# Patient Record
Sex: MALE | Race: OTHER | HISPANIC OR LATINO | URBAN - METROPOLITAN AREA
[De-identification: names, ages, dates, MRNs, and addresses within clinical notes are randomized per-mention and may not be internally consistent; named-entity substitution may affect disease eponyms.]

---

## 2018-12-25 ENCOUNTER — EMERGENCY (EMERGENCY)
Facility: HOSPITAL | Age: 23
LOS: 1 days | Discharge: HOME | End: 2018-12-25
Attending: EMERGENCY MEDICINE | Admitting: EMERGENCY MEDICINE
Payer: SELF-PAY

## 2018-12-25 VITALS
RESPIRATION RATE: 20 BRPM | TEMPERATURE: 98 F | OXYGEN SATURATION: 98 % | DIASTOLIC BLOOD PRESSURE: 82 MMHG | SYSTOLIC BLOOD PRESSURE: 141 MMHG | HEART RATE: 70 BPM

## 2018-12-25 DIAGNOSIS — R10.84 GENERALIZED ABDOMINAL PAIN: ICD-10-CM

## 2018-12-25 DIAGNOSIS — J45.909 UNSPECIFIED ASTHMA, UNCOMPLICATED: ICD-10-CM

## 2018-12-25 DIAGNOSIS — R06.02 SHORTNESS OF BREATH: ICD-10-CM

## 2018-12-25 DIAGNOSIS — R11.2 NAUSEA WITH VOMITING, UNSPECIFIED: ICD-10-CM

## 2018-12-25 DIAGNOSIS — R50.9 FEVER, UNSPECIFIED: ICD-10-CM

## 2018-12-25 LAB
ALBUMIN SERPL ELPH-MCNC: 3.5 G/DL — SIGNIFICANT CHANGE UP (ref 3.4–5)
ALP SERPL-CCNC: 74 U/L — SIGNIFICANT CHANGE UP (ref 40–120)
ALT FLD-CCNC: 20 U/L — SIGNIFICANT CHANGE UP (ref 12–42)
ANION GAP SERPL CALC-SCNC: 6 MMOL/L — LOW (ref 9–16)
AST SERPL-CCNC: 42 U/L — HIGH (ref 15–37)
BASOPHILS NFR BLD AUTO: 0.1 % — SIGNIFICANT CHANGE UP (ref 0–2)
BILIRUB SERPL-MCNC: 0.5 MG/DL — SIGNIFICANT CHANGE UP (ref 0.2–1.2)
BUN SERPL-MCNC: 8 MG/DL — SIGNIFICANT CHANGE UP (ref 7–23)
CALCIUM SERPL-MCNC: 8.9 MG/DL — SIGNIFICANT CHANGE UP (ref 8.5–10.5)
CHLORIDE SERPL-SCNC: 104 MMOL/L — SIGNIFICANT CHANGE UP (ref 96–108)
CO2 SERPL-SCNC: 29 MMOL/L — SIGNIFICANT CHANGE UP (ref 22–31)
CREAT SERPL-MCNC: 0.75 MG/DL — SIGNIFICANT CHANGE UP (ref 0.5–1.3)
EOSINOPHIL NFR BLD AUTO: 2 % — SIGNIFICANT CHANGE UP (ref 0–6)
GLUCOSE SERPL-MCNC: 131 MG/DL — HIGH (ref 70–99)
HCT VFR BLD CALC: 44.3 % — SIGNIFICANT CHANGE UP (ref 39–50)
HGB BLD-MCNC: 14.9 G/DL — SIGNIFICANT CHANGE UP (ref 13–17)
IMM GRANULOCYTES NFR BLD AUTO: 0.2 % — SIGNIFICANT CHANGE UP (ref 0–1.5)
LYMPHOCYTES # BLD AUTO: 16.2 % — SIGNIFICANT CHANGE UP (ref 13–44)
MCHC RBC-ENTMCNC: 29.2 PG — SIGNIFICANT CHANGE UP (ref 27–34)
MCHC RBC-ENTMCNC: 33.6 G/DL — SIGNIFICANT CHANGE UP (ref 32–36)
MCV RBC AUTO: 86.9 FL — SIGNIFICANT CHANGE UP (ref 80–100)
MONOCYTES NFR BLD AUTO: 8.5 % — SIGNIFICANT CHANGE UP (ref 2–14)
NEUTROPHILS NFR BLD AUTO: 73 % — SIGNIFICANT CHANGE UP (ref 43–77)
PLATELET # BLD AUTO: 226 K/UL — SIGNIFICANT CHANGE UP (ref 150–400)
POTASSIUM SERPL-MCNC: 4.2 MMOL/L — SIGNIFICANT CHANGE UP (ref 3.5–5.3)
POTASSIUM SERPL-SCNC: 4.2 MMOL/L — SIGNIFICANT CHANGE UP (ref 3.5–5.3)
PROT SERPL-MCNC: 6.7 G/DL — SIGNIFICANT CHANGE UP (ref 6.4–8.2)
RBC # BLD: 5.1 M/UL — SIGNIFICANT CHANGE UP (ref 4.2–5.8)
RBC # FLD: 13.7 % — SIGNIFICANT CHANGE UP (ref 10.3–14.5)
SODIUM SERPL-SCNC: 139 MMOL/L — SIGNIFICANT CHANGE UP (ref 132–145)
WBC # BLD: 8.4 K/UL — SIGNIFICANT CHANGE UP (ref 3.8–10.5)
WBC # FLD AUTO: 8.4 K/UL — SIGNIFICANT CHANGE UP (ref 3.8–10.5)

## 2018-12-25 PROCEDURE — 99284 EMERGENCY DEPT VISIT MOD MDM: CPT

## 2018-12-25 RX ORDER — SODIUM CHLORIDE 9 MG/ML
1000 INJECTION INTRAMUSCULAR; INTRAVENOUS; SUBCUTANEOUS ONCE
Qty: 0 | Refills: 0 | Status: COMPLETED | OUTPATIENT
Start: 2018-12-25 | End: 2018-12-25

## 2018-12-25 RX ORDER — SODIUM CHLORIDE 9 MG/ML
3 INJECTION INTRAMUSCULAR; INTRAVENOUS; SUBCUTANEOUS ONCE
Qty: 0 | Refills: 0 | Status: COMPLETED | OUTPATIENT
Start: 2018-12-25 | End: 2018-12-25

## 2018-12-25 RX ORDER — METOCLOPRAMIDE HCL 10 MG
10 TABLET ORAL ONCE
Qty: 0 | Refills: 0 | Status: COMPLETED | OUTPATIENT
Start: 2018-12-25 | End: 2018-12-25

## 2018-12-25 RX ORDER — FAMOTIDINE 10 MG/ML
20 INJECTION INTRAVENOUS ONCE
Qty: 0 | Refills: 0 | Status: COMPLETED | OUTPATIENT
Start: 2018-12-25 | End: 2018-12-25

## 2018-12-25 RX ADMIN — SODIUM CHLORIDE 3 MILLILITER(S): 9 INJECTION INTRAMUSCULAR; INTRAVENOUS; SUBCUTANEOUS at 21:19

## 2018-12-25 RX ADMIN — SODIUM CHLORIDE 1000 MILLILITER(S): 9 INJECTION INTRAMUSCULAR; INTRAVENOUS; SUBCUTANEOUS at 21:19

## 2018-12-25 RX ADMIN — FAMOTIDINE 20 MILLIGRAM(S): 10 INJECTION INTRAVENOUS at 21:19

## 2018-12-25 RX ADMIN — Medication 30 MILLILITER(S): at 21:19

## 2018-12-25 RX ADMIN — Medication 10 MILLIGRAM(S): at 21:19

## 2018-12-25 NOTE — ED PROVIDER NOTE - OBJECTIVE STATEMENT
24 y/o M w/ pmhx of asthma BIBEMS presents to ED w/ c/o lower abd pain w/ N/V/D since yesterday. Pt states that they feel "feverish". Pt also states that they have been having difficulty breathing and wheezing. Pt was on hormones and stopped 6 months ago due to being banned from the clinic they were receiving them from. Denies any chest pain, back pain, 22 y/o M w/ pmhx of asthma BIBEMS presents to ED w/ c/o lower abd pain w/ N/V/D since yesterday.  Pt was on hormones and stopped 6 months ago due to being banned from the clinic they were receiving them from. Denies any chest pain, back pain,

## 2018-12-25 NOTE — ED PROVIDER NOTE - NSFOLLOWUPINSTRUCTIONS_ED_ALL_ED_FT
PLEASE FOLLOW-UP WITH YOUR PRIMARY CARE DOCTOR IN 1-2 DAY FOR FURTHER EVALUATION.  PLEASE TAKE ALL PAPERWORK FROM TODAY'S VISIT TO YOUR PRIMARY DOCTOR.  IF YOU DO NOT HAVE A PRIMARY CARE DOCTOR PLEASE REFER TO THE OFFICE/CLINIC INFORMATION GIVEN ABOVE.  YOU MAY ALSO CALL [phone] AND ASK FOR MS. DARIELA ELLER.  SHE CAN HELP YOU MAKE A FOLLOW-UP APPOINTMENT.  HER HOURS ARE 11AM-7PM MONDAY - FRIDAY

## 2020-06-18 ENCOUNTER — APPOINTMENT (OUTPATIENT)
Dept: INFECTIOUS DISEASE | Facility: CLINIC | Age: 25
End: 2020-06-18

## 2020-06-18 PROBLEM — Z00.00 ENCOUNTER FOR PREVENTIVE HEALTH EXAMINATION: Status: ACTIVE | Noted: 2020-06-18

## 2021-01-24 ENCOUNTER — EMERGENCY (EMERGENCY)
Facility: HOSPITAL | Age: 26
LOS: 1 days | Discharge: HOME | End: 2021-01-24
Attending: EMERGENCY MEDICINE | Admitting: EMERGENCY MEDICINE
Payer: SELF-PAY

## 2021-01-24 VITALS
RESPIRATION RATE: 18 BRPM | OXYGEN SATURATION: 97 % | DIASTOLIC BLOOD PRESSURE: 78 MMHG | HEART RATE: 95 BPM | SYSTOLIC BLOOD PRESSURE: 135 MMHG | TEMPERATURE: 98 F

## 2021-01-24 DIAGNOSIS — K04.7 PERIAPICAL ABSCESS WITHOUT SINUS: ICD-10-CM

## 2021-01-24 DIAGNOSIS — R22.0 LOCALIZED SWELLING, MASS AND LUMP, HEAD: ICD-10-CM

## 2021-01-24 LAB
ALBUMIN SERPL ELPH-MCNC: 3.9 G/DL — SIGNIFICANT CHANGE UP (ref 3.4–5)
ALP SERPL-CCNC: 64 U/L — SIGNIFICANT CHANGE UP (ref 40–120)
ALT FLD-CCNC: 12 U/L — SIGNIFICANT CHANGE UP (ref 12–42)
ANION GAP SERPL CALC-SCNC: 6 MMOL/L — LOW (ref 9–16)
AST SERPL-CCNC: 14 U/L — LOW (ref 15–37)
BASOPHILS # BLD AUTO: 0.01 K/UL — SIGNIFICANT CHANGE UP (ref 0–0.2)
BASOPHILS NFR BLD AUTO: 0.1 % — SIGNIFICANT CHANGE UP (ref 0–2)
BILIRUB SERPL-MCNC: 0.6 MG/DL — SIGNIFICANT CHANGE UP (ref 0.2–1.2)
BUN SERPL-MCNC: 17 MG/DL — SIGNIFICANT CHANGE UP (ref 7–23)
CALCIUM SERPL-MCNC: 9 MG/DL — SIGNIFICANT CHANGE UP (ref 8.5–10.5)
CHLORIDE SERPL-SCNC: 103 MMOL/L — SIGNIFICANT CHANGE UP (ref 96–108)
CO2 SERPL-SCNC: 29 MMOL/L — SIGNIFICANT CHANGE UP (ref 22–31)
CREAT SERPL-MCNC: 1.18 MG/DL — SIGNIFICANT CHANGE UP (ref 0.5–1.3)
EOSINOPHIL # BLD AUTO: 0.07 K/UL — SIGNIFICANT CHANGE UP (ref 0–0.5)
EOSINOPHIL NFR BLD AUTO: 0.8 % — SIGNIFICANT CHANGE UP (ref 0–6)
GLUCOSE SERPL-MCNC: 108 MG/DL — HIGH (ref 70–99)
HCT VFR BLD CALC: 37 % — LOW (ref 39–50)
HGB BLD-MCNC: 12.6 G/DL — LOW (ref 13–17)
IMM GRANULOCYTES NFR BLD AUTO: 0.3 % — SIGNIFICANT CHANGE UP (ref 0–1.5)
LACTATE SERPL-SCNC: 0.6 MMOL/L — SIGNIFICANT CHANGE UP (ref 0.4–2)
LYMPHOCYTES # BLD AUTO: 1.39 K/UL — SIGNIFICANT CHANGE UP (ref 1–3.3)
LYMPHOCYTES # BLD AUTO: 15.4 % — SIGNIFICANT CHANGE UP (ref 13–44)
MCHC RBC-ENTMCNC: 28.9 PG — SIGNIFICANT CHANGE UP (ref 27–34)
MCHC RBC-ENTMCNC: 34.1 GM/DL — SIGNIFICANT CHANGE UP (ref 32–36)
MCV RBC AUTO: 84.9 FL — SIGNIFICANT CHANGE UP (ref 80–100)
MONOCYTES # BLD AUTO: 0.8 K/UL — SIGNIFICANT CHANGE UP (ref 0–0.9)
MONOCYTES NFR BLD AUTO: 8.8 % — SIGNIFICANT CHANGE UP (ref 2–14)
NEUTROPHILS # BLD AUTO: 6.75 K/UL — SIGNIFICANT CHANGE UP (ref 1.8–7.4)
NEUTROPHILS NFR BLD AUTO: 74.6 % — SIGNIFICANT CHANGE UP (ref 43–77)
NRBC # BLD: 0 /100 WBCS — SIGNIFICANT CHANGE UP (ref 0–0)
PLATELET # BLD AUTO: 203 K/UL — SIGNIFICANT CHANGE UP (ref 150–400)
POTASSIUM SERPL-MCNC: 3.7 MMOL/L — SIGNIFICANT CHANGE UP (ref 3.5–5.3)
POTASSIUM SERPL-SCNC: 3.7 MMOL/L — SIGNIFICANT CHANGE UP (ref 3.5–5.3)
PROT SERPL-MCNC: 7.9 G/DL — SIGNIFICANT CHANGE UP (ref 6.4–8.2)
RBC # BLD: 4.36 M/UL — SIGNIFICANT CHANGE UP (ref 4.2–5.8)
RBC # FLD: 12.4 % — SIGNIFICANT CHANGE UP (ref 10.3–14.5)
SODIUM SERPL-SCNC: 138 MMOL/L — SIGNIFICANT CHANGE UP (ref 132–145)
WBC # BLD: 9.05 K/UL — SIGNIFICANT CHANGE UP (ref 3.8–10.5)
WBC # FLD AUTO: 9.05 K/UL — SIGNIFICANT CHANGE UP (ref 3.8–10.5)

## 2021-01-24 PROCEDURE — 99284 EMERGENCY DEPT VISIT MOD MDM: CPT

## 2021-01-24 PROCEDURE — 99053 MED SERV 10PM-8AM 24 HR FAC: CPT

## 2021-01-24 RX ORDER — SODIUM CHLORIDE 9 MG/ML
1000 INJECTION INTRAMUSCULAR; INTRAVENOUS; SUBCUTANEOUS ONCE
Refills: 0 | Status: COMPLETED | OUTPATIENT
Start: 2021-01-24 | End: 2021-01-24

## 2021-01-24 RX ORDER — AMPICILLIN SODIUM AND SULBACTAM SODIUM 250; 125 MG/ML; MG/ML
3 INJECTION, POWDER, FOR SUSPENSION INTRAMUSCULAR; INTRAVENOUS ONCE
Refills: 0 | Status: COMPLETED | OUTPATIENT
Start: 2021-01-24 | End: 2021-01-24

## 2021-01-24 RX ORDER — AMPICILLIN SODIUM AND SULBACTAM SODIUM 250; 125 MG/ML; MG/ML
INJECTION, POWDER, FOR SUSPENSION INTRAMUSCULAR; INTRAVENOUS
Refills: 0 | Status: DISCONTINUED | OUTPATIENT
Start: 2021-01-24 | End: 2021-01-24

## 2021-01-24 RX ORDER — KETOROLAC TROMETHAMINE 30 MG/ML
30 SYRINGE (ML) INJECTION ONCE
Refills: 0 | Status: DISCONTINUED | OUTPATIENT
Start: 2021-01-24 | End: 2021-01-24

## 2021-01-24 RX ORDER — MORPHINE SULFATE 50 MG/1
4 CAPSULE, EXTENDED RELEASE ORAL ONCE
Refills: 0 | Status: DISCONTINUED | OUTPATIENT
Start: 2021-01-24 | End: 2021-01-24

## 2021-01-24 RX ADMIN — SODIUM CHLORIDE 2000 MILLILITER(S): 9 INJECTION INTRAMUSCULAR; INTRAVENOUS; SUBCUTANEOUS at 22:50

## 2021-01-24 RX ADMIN — Medication 30 MILLIGRAM(S): at 22:50

## 2021-01-24 RX ADMIN — AMPICILLIN SODIUM AND SULBACTAM SODIUM 200 GRAM(S): 250; 125 INJECTION, POWDER, FOR SUSPENSION INTRAMUSCULAR; INTRAVENOUS at 22:49

## 2021-01-24 RX ADMIN — MORPHINE SULFATE 4 MILLIGRAM(S): 50 CAPSULE, EXTENDED RELEASE ORAL at 22:49

## 2021-01-24 NOTE — ED PROVIDER NOTE - PHYSICAL EXAMINATION
R inferior molar tender to percussion, gum erythema    R face - face swelling from angle of mandible to cheek, tender, no fluctuance, mild to moderate trismus

## 2021-01-24 NOTE — ED PROVIDER NOTE - OBJECTIVE STATEMENT
25 trans female pt, recent dx of HIV and being worked up to get started on HAART, on bactrim, following up at [facility]. Presents w 2-3 days of worsening R face swelling in the setting of tooth pain from a lower molar. Swelling worsening, trouble w opening mouth. no fever. no chills, no blurred vision. has not been able to follow up w dental.

## 2021-01-24 NOTE — ED PROVIDER NOTE - CLINICAL SUMMARY MEDICAL DECISION MAKING FREE TEXT BOX
Pt w facial swelling, likely from dental infection progressing to facial cellulitis vs reactive inflammation from dental infection. Will get labs, provide analgesia and CT to R/O abscess Pt w facial swelling, likely from dental infection progressing to facial cellulitis vs reactive inflammation from dental infection. Will get labs, provide analgesia and CT to R/O abscess    ED Addendum (change of shift):  Care turned over to me at 2400.  Labs reviewed & no evidence of leukocytosis.  Patient received IV Unasyn in the ED.  CT is consistent with a dental abscess.  No airway compromise or evidence of Aristeo's angina or significant trismus in ED, and patient noted to be tolerating liquids in ED without difficulty.  Rx for Augmentin & Ibuprofen.  Patient referred to [facility] & encouraged to call first thing in the morning for follow-up appointment.  -- Jarrod lFetcher MD

## 2021-01-24 NOTE — ED ADULT NURSE NOTE - OBJECTIVE STATEMENT
Pt presents to ED c/o right jaw/cheek pain and worsening swelling x3 days. Pt reports difficulties eating and speaking, has not seen dentist in several years. Pt is HIV+, noncompliant with medications.

## 2021-01-24 NOTE — ED PROVIDER NOTE - NSFOLLOWUPINSTRUCTIONS_ED_ALL_ED_FT
You have a DENTAL ABSCESS.  Take antibiotic (Augmentin) twice/day x 10 days.  Motrin (Ibuprofen) as needed for pain.  Call [facility] FIRST THING IN THE MORNING for follow-up appointment.    ** PLEASE NOTE: The entrance for all patients has been moved to [address] [place] [address] (between [address] and [address] in order to accommodate our updated screening and entry procedures. Until further notice, the [address] entrance will not be open to patients.**    Patient appointment hours are 8:00 am – 5:00 pm, Monday – Friday.  To schedule your first appointment, please call [phone].  To schedule an appointment with Emergency Services/Urgent Care, please call [phone]. You have a DENTAL ABSCESS.  Take antibiotic (Augmentin) twice/day x 10 days.  Motrin (Ibuprofen) as needed for pain.  Call [facility] FIRST THING IN THE MORNING for follow-up appointment.    ** PLEASE NOTE: The entrance for all patients has been moved to [address] (between [address] and [address] in order to accommodate our updated screening and entry procedures. Until further notice, the [address] entrance will not be open to patients.**    Patient appointment hours are 8:00 am – 5:00 pm, Monday – Friday.  To schedule your first appointment, please call [phone].  To schedule an appointment with Emergency Services/Urgent Care, please call [phone].    A dental abscess is a collection of pus in or around a tooth that results from an infection. An abscess can cause pain in the affected area as well as other symptoms. Treatment is important to help with symptoms and to prevent the infection from spreading.     What are the causes?  This condition is caused by a bacterial infection around the root of the tooth that involves the inner part of the tooth (pulp). It may result from:    Severe tooth decay.  Trauma to the tooth, such as a broken or chipped tooth, that allows bacteria to enter into the pulp.  Severe gum disease around a tooth.    What increases the risk?  This condition is more likely to develop in males. It is also more likely to develop in people who:    Have dental decay (cavities).  Eat sugary snacks between meals.  Use tobacco products.  Have diabetes.  Have a weakened disease-fighting system (immune system).  Do not brush and care for their teeth regularly.    What are the signs or symptoms?  Symptoms of this condition include:    Severe pain in and around the infected tooth.  Swelling and redness around the infected tooth, in the mouth, or in the face.  Tenderness.  Pus drainage.  Bad breath.  Bitter taste in the mouth.  Difficulty swallowing.  Difficulty opening the mouth.  Nausea.  Vomiting.  Chills.  Swollen neck glands.  Fever.    How is this diagnosed?  This condition is diagnosed based on:    Your symptoms and your medical and dental history.  An examination of the infected tooth. During the exam, your dentist may tap on the infected tooth.    You may also have X-rays of the affected area.    How is this treated?  This condition is treated by getting rid of the infection. This may be done with:    Incision and drainage. This procedure is done by making an incision in the abscess to drain out the pus. Removing pus is the first priority in treating an abscess.  Antibiotic medicines. These may be used in certain situations.  Antibacterial mouth rinse.  A root canal. This may be performed to save the tooth. Your dentist accesses the visible part of your tooth (crown) with a drill and removes any damaged pulp. Then the space is filled and sealed off.  Tooth extraction. The tooth is pulled out if it cannot be saved by other treatment.    You may also receive treatment for pain, such as:    Acetaminophen or NSAIDs.  Gels that contain a numbing medicine.  An injection to block the pain near your nerve.    Follow these instructions at home:  Medicines    Take over-the-counter and prescription medicines only as told by your dentist.  If you were prescribed an antibiotic, take it as told by your dentist. Do not stop taking the antibiotic even if you start to feel better.  If you were prescribed a gel that contains a numbing medicine, use it exactly as told in the directions. Do not use these gels for children who are younger than 2 years of age.  Do not drive or use heavy machinery while taking prescription pain medicine.    General instructions    Rinse out your mouth often with salt water to relieve pain or swelling. To make a salt-water mixture, completely dissolve ½–1 tsp of salt in 1 cup of warm water.  Eat a soft diet while your abscess is healing.  Drink enough fluid to keep your urine pale yellow.  Do not apply heat to the outside of your mouth.  Do not use any products that contain nicotine or tobacco, such as cigarettes and e-cigarettes. If you need help quitting, ask your health care provider.  Keep all follow-up visits as told by your dentist. This is important.    How is this prevented?  Brush your teeth every morning and night with fluoride toothpaste. Floss one time each day.  Get regularly scheduled dental cleanings.  Consider having a dental sealant applied on teeth that have deep holes (caries).  Drink fluoridated water regularly. This includes most tap water. Check the label on bottled water to see if it contains fluoride.  Drink water instead of sugary drinks.  Eat healthy meals and snacks.  Wear a mouth guard or face shield to protect your teeth while playing sports.    Contact a health care provider if:  Your pain is worse and is not helped by medicine.    Get help right away if:  You have a fever or chills.  Your symptoms suddenly get worse.  You have a very bad headache.  You have problems breathing or swallowing.  You have trouble opening your mouth.  You have swelling in your neck or around your eye.    Summary  A dental abscess is a collection of pus in or around a tooth that results from an infection.  A dental abscess may result from severe tooth decay, trauma to the tooth, or severe gum disease around a tooth.  Symptoms include severe pain, swelling, redness, and drainage of pus in and around the infected tooth.  The first priority in treating a dental abscess is to drain out the pus. Treatment may also involve removing damage inside the tooth (root canal) or pulling out (extracting) the tooth.

## 2021-01-24 NOTE — ED PROVIDER NOTE - PATIENT PORTAL LINK FT
You can access the FollowMyHealth Patient Portal offered by [facility] by registering at the following website: [email or web address]. By joining [employer]’s FollowMyHealth portal, you will also be able to view your health information using other applications (apps) compatible with our system.

## 2021-01-25 VITALS
DIASTOLIC BLOOD PRESSURE: 77 MMHG | SYSTOLIC BLOOD PRESSURE: 126 MMHG | RESPIRATION RATE: 18 BRPM | OXYGEN SATURATION: 98 % | TEMPERATURE: 98 F | HEART RATE: 78 BPM

## 2021-01-25 PROCEDURE — 70487 CT MAXILLOFACIAL W/DYE: CPT | Mod: 26

## 2021-01-25 RX ORDER — IBUPROFEN 200 MG
1 TABLET ORAL
Qty: 20 | Refills: 0
Start: 2021-01-25 | End: 2021-01-29

## 2021-01-30 LAB
CULTURE RESULTS: SIGNIFICANT CHANGE UP
CULTURE RESULTS: SIGNIFICANT CHANGE UP
SPECIMEN SOURCE: SIGNIFICANT CHANGE UP
SPECIMEN SOURCE: SIGNIFICANT CHANGE UP

## 2021-08-22 ENCOUNTER — EMERGENCY (EMERGENCY)
Facility: HOSPITAL | Age: 26
LOS: 1 days | Discharge: HOME | End: 2021-08-22
Admitting: EMERGENCY MEDICINE
Payer: MEDICAID

## 2021-08-22 VITALS
HEART RATE: 102 BPM | SYSTOLIC BLOOD PRESSURE: 119 MMHG | DIASTOLIC BLOOD PRESSURE: 80 MMHG | RESPIRATION RATE: 16 BRPM | OXYGEN SATURATION: 100 % | TEMPERATURE: 99 F

## 2021-08-22 VITALS
RESPIRATION RATE: 16 BRPM | OXYGEN SATURATION: 99 % | SYSTOLIC BLOOD PRESSURE: 113 MMHG | TEMPERATURE: 99 F | HEART RATE: 90 BPM | DIASTOLIC BLOOD PRESSURE: 65 MMHG

## 2021-08-22 DIAGNOSIS — Z20.822 CONTACT WITH AND (SUSPECTED) EXPOSURE TO COVID-19: ICD-10-CM

## 2021-08-22 DIAGNOSIS — R53.1 WEAKNESS: ICD-10-CM

## 2021-08-22 DIAGNOSIS — B34.9 VIRAL INFECTION, UNSPECIFIED: ICD-10-CM

## 2021-08-22 LAB
ALBUMIN SERPL ELPH-MCNC: 3.4 G/DL — SIGNIFICANT CHANGE UP (ref 3.4–5)
ALP SERPL-CCNC: 66 U/L — SIGNIFICANT CHANGE UP (ref 40–120)
ALT FLD-CCNC: 23 U/L — SIGNIFICANT CHANGE UP (ref 12–42)
ANION GAP SERPL CALC-SCNC: 8 MMOL/L — LOW (ref 9–16)
AST SERPL-CCNC: 26 U/L — SIGNIFICANT CHANGE UP (ref 15–37)
BASOPHILS # BLD AUTO: 0.01 K/UL — SIGNIFICANT CHANGE UP (ref 0–0.2)
BASOPHILS NFR BLD AUTO: 0.1 % — SIGNIFICANT CHANGE UP (ref 0–2)
BILIRUB SERPL-MCNC: 1.1 MG/DL — SIGNIFICANT CHANGE UP (ref 0.2–1.2)
BUN SERPL-MCNC: 9 MG/DL — SIGNIFICANT CHANGE UP (ref 7–23)
CALCIUM SERPL-MCNC: 8.5 MG/DL — SIGNIFICANT CHANGE UP (ref 8.5–10.5)
CHLORIDE SERPL-SCNC: 105 MMOL/L — SIGNIFICANT CHANGE UP (ref 96–108)
CO2 SERPL-SCNC: 29 MMOL/L — SIGNIFICANT CHANGE UP (ref 22–31)
CREAT SERPL-MCNC: 0.76 MG/DL — SIGNIFICANT CHANGE UP (ref 0.5–1.3)
EOSINOPHIL # BLD AUTO: 0.03 K/UL — SIGNIFICANT CHANGE UP (ref 0–0.5)
EOSINOPHIL NFR BLD AUTO: 0.4 % — SIGNIFICANT CHANGE UP (ref 0–6)
GLUCOSE SERPL-MCNC: 96 MG/DL — SIGNIFICANT CHANGE UP (ref 70–99)
HCT VFR BLD CALC: 37.7 % — SIGNIFICANT CHANGE UP (ref 34.5–45)
HGB BLD-MCNC: 13.2 G/DL — SIGNIFICANT CHANGE UP (ref 11.5–15.5)
IMM GRANULOCYTES NFR BLD AUTO: 0.3 % — SIGNIFICANT CHANGE UP (ref 0–1.5)
LYMPHOCYTES # BLD AUTO: 0.45 K/UL — LOW (ref 1–3.3)
LYMPHOCYTES # BLD AUTO: 6 % — LOW (ref 13–44)
MCHC RBC-ENTMCNC: 29.7 PG — SIGNIFICANT CHANGE UP (ref 27–34)
MCHC RBC-ENTMCNC: 35 GM/DL — SIGNIFICANT CHANGE UP (ref 32–36)
MCV RBC AUTO: 84.7 FL — SIGNIFICANT CHANGE UP (ref 80–100)
MONOCYTES # BLD AUTO: 0.49 K/UL — SIGNIFICANT CHANGE UP (ref 0–0.9)
MONOCYTES NFR BLD AUTO: 6.6 % — SIGNIFICANT CHANGE UP (ref 2–14)
NEUTROPHILS # BLD AUTO: 6.46 K/UL — SIGNIFICANT CHANGE UP (ref 1.8–7.4)
NEUTROPHILS NFR BLD AUTO: 86.6 % — HIGH (ref 43–77)
NRBC # BLD: 0 /100 WBCS — SIGNIFICANT CHANGE UP (ref 0–0)
PLATELET # BLD AUTO: 234 K/UL — SIGNIFICANT CHANGE UP (ref 150–400)
POTASSIUM SERPL-MCNC: 4 MMOL/L — SIGNIFICANT CHANGE UP (ref 3.5–5.3)
POTASSIUM SERPL-SCNC: 4 MMOL/L — SIGNIFICANT CHANGE UP (ref 3.5–5.3)
PROT SERPL-MCNC: 7 G/DL — SIGNIFICANT CHANGE UP (ref 6.4–8.2)
RBC # BLD: 4.45 M/UL — SIGNIFICANT CHANGE UP (ref 3.8–5.2)
RBC # FLD: 13.2 % — SIGNIFICANT CHANGE UP (ref 10.3–14.5)
SARS-COV-2 RNA SPEC QL NAA+PROBE: SIGNIFICANT CHANGE UP
SODIUM SERPL-SCNC: 142 MMOL/L — SIGNIFICANT CHANGE UP (ref 132–145)
WBC # BLD: 7.46 K/UL — SIGNIFICANT CHANGE UP (ref 3.8–10.5)
WBC # FLD AUTO: 7.46 K/UL — SIGNIFICANT CHANGE UP (ref 3.8–10.5)

## 2021-08-22 PROCEDURE — 71045 X-RAY EXAM CHEST 1 VIEW: CPT | Mod: 26

## 2021-08-22 PROCEDURE — 99284 EMERGENCY DEPT VISIT MOD MDM: CPT | Mod: 25

## 2021-08-22 RX ORDER — SODIUM CHLORIDE 9 MG/ML
1000 INJECTION INTRAMUSCULAR; INTRAVENOUS; SUBCUTANEOUS ONCE
Refills: 0 | Status: COMPLETED | OUTPATIENT
Start: 2021-08-22 | End: 2021-08-22

## 2021-08-22 RX ORDER — ACETAMINOPHEN 500 MG
650 TABLET ORAL ONCE
Refills: 0 | Status: COMPLETED | OUTPATIENT
Start: 2021-08-22 | End: 2021-08-22

## 2021-08-22 RX ADMIN — SODIUM CHLORIDE 1000 MILLILITER(S): 9 INJECTION INTRAMUSCULAR; INTRAVENOUS; SUBCUTANEOUS at 14:40

## 2021-08-22 RX ADMIN — Medication 650 MILLIGRAM(S): at 14:40

## 2021-08-22 NOTE — ED PROVIDER NOTE - OBJECTIVE STATEMENT
47 yo male to female transgender presents c/o generalized weakness today, c/o body aches x 2 days. hx HIV has been off of meds for one month, now started back on meds 45 yo male to female transgender presents c/o generalized weakness today, c/o body aches x 2 days. hx HIV has been off of meds for one month, now started back on meds. c/o feeling warm and cough. states boyfriend tested pos for covid-19 one week ago. denies recent alcohol or drug use. not vaccinated for covid-19. no headache, neck pain or rash.

## 2021-08-22 NOTE — ED PROVIDER NOTE - PATIENT PORTAL LINK FT
You can access the FollowMyHealth Patient Portal offered by [facility] by registering at the following website: [email or web address]. By joining [employer]’s FollowMyHealth portal, you will also be able to view your health information using other applications (apps) compatible with our system.

## 2021-08-22 NOTE — ED ADULT TRIAGE NOTE - CHIEF COMPLAINT QUOTE
Pt c/o flu like symptoms x 1 week, cough and fevers. Pt boyfriend is COVID-19 +,, pt is unvaccinated, h/o HIV not on medication.

## 2021-08-22 NOTE — ED PROVIDER NOTE - CLINICAL SUMMARY MEDICAL DECISION MAKING FREE TEXT BOX
viral syndrome, may be covid-19 as recent exposure and not vaccinated even though covid-19 swab neg, advised quarantine, supportive care, return precautions discussed.

## 2021-08-22 NOTE — ED PROVIDER NOTE - NSCAREINITIATED _GEN_ER
Relayed recommendations listed below.  Patient states she will have blood drawn on 8-13-18 because she will be going out of town on 8-19-18.  Patient verbalized understanding and denies any other needs at this time.  Encouraged patient to contact us with any questions,concerns, or changes in condition.   Charo King)

## 2021-08-22 NOTE — ED PROVIDER NOTE - NSFOLLOWUPINSTRUCTIONS_ED_ALL_ED_FT
You may still have covid-19 even though your swan tested negative  Isolate yourself from others for 10 days  Take Ibuprofen 600mg every 6-8 hours as needed for pain, take with food, and in addition you may take Tylenol 500 mg every 6-8 hours as needed for pain    Please make sure you take your HIV medications     A viral respiratory infection is an illness that affects parts of the body used for breathing, like the lungs, nose, and throat. It is caused by a germ called a virus. Symptoms can include runny nose, coughing, sneezing, fatigue, body aches, sore throat, fever, or headache. Over the counter medicine can be used to manage the symptoms but the infection typically goes away on its own in 5 to 10 days.     SEEK IMMEDIATE MEDICAL CARE IF YOU HAVE ANY OF THE FOLLOWING SYMPTOMS: shortness of breath, chest pain, fever over 10 days, or lightheadedness/dizziness.

## 2021-08-22 NOTE — ED PROVIDER NOTE - NSTIMEPROVIDERCAREINITIATE_GEN_ER
Please tell the patient that albuterol was sent in. As was his Levothyroxine as TSH normal. Cholesterol still elevated, needs to watch intake of cholesterol(red meat, creamy sauces, fast food among others. CBC still grossly abnormal, understand that seen hem but still going to watch it. Kidney function back no within normal limits, if you want me to talk with him I can.   22-Aug-2021 14:07

## 2021-08-22 NOTE — ED PROVIDER NOTE - PHYSICAL EXAMINATION
CONSTITUTIONAL: Well-appearing; well-nourished; in no apparent distress.   	HEAD: Normocephalic; atraumatic.   	EYES:  conjunctiva and sclera clear  	ENT: normal nose; no rhinorrhea; normal pharynx with no erythema or lesions.   	NECK: Supple; non-tender;   	CARDIOVASCULAR: Normal S1, S2; no murmurs, rubs, or gallops. Regular rate and rhythm.   	RESPIRATORY: Breathing easily; breath sounds clear and equal bilaterally; no wheezes, rhonchi, or rales.  	GI: Soft; non-distended; non-tender; no guarding or rebound.   	EXT: No cyanosis or edema; N/V intact  	SKIN: Normal for age and race; warm; dry; good turgor; no apparent lesions or rash.   	NEURO: A & O x 3; face symmetric; grossly unremarkable.   PSYCHOLOGICAL: The patient’s mood and manner are appropriate.

## 2021-11-18 ENCOUNTER — APPOINTMENT (OUTPATIENT)
Dept: PLASTIC SURGERY | Facility: CLINIC | Age: 26
End: 2021-11-18

## 2022-02-03 NOTE — ED PROVIDER NOTE - CPE EDP NEURO NORM
normal... Nasal Turnover Hinge Flap Text: The defect edges were debeveled with a #15 scalpel blade.  Given the size, depth, location of the defect and the defect being full thickness a nasal turnover hinge flap was deemed most appropriate.  Using a sterile surgical marker, an appropriate hinge flap was drawn incorporating the defect. The area thus outlined was incised with a #15 scalpel blade. The flap was designed to recreate the nasal mucosal lining and the alar rim. The skin margins were undermined to an appropriate distance in all directions utilizing iris scissors.

## 2022-05-05 DIAGNOSIS — Z78.9 OTHER SPECIFIED HEALTH STATUS: ICD-10-CM

## 2022-05-06 ENCOUNTER — APPOINTMENT (OUTPATIENT)
Dept: PLASTIC SURGERY | Facility: CLINIC | Age: 27
End: 2022-05-06

## 2022-05-13 ENCOUNTER — APPOINTMENT (OUTPATIENT)
Dept: PLASTIC SURGERY | Facility: CLINIC | Age: 27
End: 2022-05-13

## 2022-05-13 PROCEDURE — XXXXX: CPT | Mod: 1L

## 2022-11-21 ENCOUNTER — APPOINTMENT (OUTPATIENT)
Dept: PLASTIC SURGERY | Facility: CLINIC | Age: 27
End: 2022-11-21

## 2022-11-21 VITALS
DIASTOLIC BLOOD PRESSURE: 68 MMHG | HEIGHT: 78 IN | WEIGHT: 189 LBS | BODY MASS INDEX: 21.87 KG/M2 | HEART RATE: 68 BPM | SYSTOLIC BLOOD PRESSURE: 115 MMHG

## 2022-11-21 PROCEDURE — 99203 OFFICE O/P NEW LOW 30 MIN: CPT

## 2022-11-21 PROCEDURE — 99072 ADDL SUPL MATRL&STAF TM PHE: CPT

## 2022-11-21 NOTE — DISCUSSION/SUMMARY
[FreeTextEntry1] : This allyn patient began transitioning at 19 years of age\par She has been on hormonal therapy consistently since age 20 years\par She has been in therapy and was diagnosed with Gender Dysphoria \par concerned about certain facial features that appear masculine \par desires facial feminization surgery\par followed by Transgender team\par The following facial features appear masculine and will need to be modified:\par -Brow\par -Nose\par -Jawline\par -Neck\par -Lips (upper augmentation)\par -Eyes\par \par PSxHx:\par Surgery: Breast augmentation\par Year 2014\par Hospital: in [place], [place]\par Surgeon: Dr. Juan Dietrich Jr, \par Complications: None\par \par PMedHx:\par HIV+ undetectable viral load\par \par Medications: \par -Estrogen\par -Spironolactone 200mg QD\par -Bictarvi\par Caproheptadine 4mg/d\par  \par FH: noncontributory\par  \par SH: occasional marijuana use,  no secondary tobacco use,\par  \par ROS:\par General health / Constitutional\par      no fever, no chills, no unusual weight changes, no energy level changes, no night sweats\par Skin\par       No color or pigmentation changes, no pruritis, no rashes, no ulcers,  \par Hair\par       No changes in color, texture,  distribution, loss\par Nails\par       No color changes, brittleness, infection\par Head\par       No headaches, no new jaw pain\par Eyes\par       Good visual acuity, no color blindness, no corrective lenses, no photophobia, no diplopia, no blurred vision, no infection, pain, no medications, \par Ear\par      no tinnitus, no discharge, no pain, no medications\par Nose\par      no epistaxis, no rhinorrhea, no rhinitis, no pain, \par Mouth & Throat\par      no gingivitis, no gingival bleeding, no ulcers, no voice changes, no changes in oral mucosa or tongue\par Neck\par      no stiffness, no pain, no lymphadenitis, no thyroid disorders, \par Respiratory\par      no cough, no dyspnea, no wheezing,  no chest pain, cyanosis, no pneumonia, no asthma, \par Cardiovascular\par      no chest pain, no palpitations, no irregular rhythm, no tachycardia, no bradycardia, no heart failure, no dyspnea on exertion (CARMONA), no edema\par Gastrointestinal\par      no nausea / vomiting, no dysphagia, no reflux / GERD, no abdominal pain, no jaundice\par Musculoskeletal\par      no pain in muscles, bones, or joints; no fractures, no dislocations, no muscular weakness, no atrophy\par Neurological\par      no paresis, no paralysis, no paresthesia, no seizures, no dizziness, no syncope, no ataxia, no tremor\par Psychological\par      no childhood behavioral problems, no irritability, no sleep changes\par Hematological\par      no anemia, no bruising, no bleeding tendencies, \par  \par PHYSICAL EXAM\par General\par WDWN, in no distress,  A & O x 3 (person, place, time), very tall frame, thin\par HEENT\par Head: AT/NC (atraumatic, normocephalic), including TMJ, sensory and motor; \par Prominent brow and lateral orbital rim type III\par Eyes: Downward slanting lateral canthiEOMI, PERRLA\par Ears: exterior, nl hearing,\par Nose: thick skin, wide nasal dorsum, bulbous nasal tip with acute nasiolabial angle\par intranasal exam showed enlarged turbinates and deviated caudal septum\par Throat & mouth: gd palate elevation, nl tongue mobility, nl tonsil size\par Prominent mandibular angle with active masseter, widened lower face\par Wide boxy chin\par Aurora-nasal maxillary hypoplasia\par \par  \par Neck: no masses, nl pulses, excess submental fat with neck ptosis and lack of cervical-mental angle\par + prominent tracheal bulge ("Fco's Apple")\par  \par We had a 25 minute meeting with the patient discussing diagnosis and medical management issues and outcomes.\par First stage FFS:\par -Brow lift\par -frontal sinus setback\par -supraorbital brow recontouring\par -mandibular angle reduction b/l\par -chin narrowing\par -rhinoplasty, SMR, cartilage grafting\par -Submental fat excision\par -Platysmaplasty (neck tightening)\par -Upper lip augmentation\par \par Needs a 3D CT scan and Virtual Surgical Planning\par She will need to provide a letter from her therapist and hormone provider\par \par CPT 56068: Frontal sinus setback\par CPT 81459: Orbital reconstruction\par CPT 51885: Bilateral browlift, Hairline lowering\par CPT 04286: Supraorbital contouring\par CPT 86167 Mandibular angle resection\par CPT 47337: Malar/Temporal fat grafting\par CPT 47543: Osseous genioplasty\par CPT 00982-16: Bilateral cheek implants\par CPT 66574: Rhinoplasty\par CPT 33357: Submucous resection of septum\par CPT 39035: Tracheal Shave\par CPT 00249: Submental fat excision\par CPT 82113: Platysmaplasty, Necklift\par CPT 91028: Upper lip augmentation\par CPT 92117-14: Bilateral masseteric muscle resection\par \par The following feature modifications will be requested based on the below medical necessity reasons: \par \par 29482 (Frontal sinus setback): Previous exposure to testosterone has led this patient to have a male appearing forehead with a more bossed shaped; this is opposed to a female appearing forehead that is more flat. A frontal sinus setback procedure will reshape the anterior wall of the frontal sinus by pushing back the bone and change the contour from ‘convex’ (male-shape) to ‘flat’ (female-shape). This surgical change will create a more flattened feminine brow appearance.\par \par 05957 (Browlift): Previous exposure to testosterone has led to the patient having a male ‘M-shaped’ hairline as opposed to a female ‘upside-down U-shaped’ hairline. Her receded hairline also creates a high, broad male appearing forehead. Her low set eyebrows on top of her orbital roof rim give her a spears, male-appearing look. Both of these male traits: a high hairline and low-set brows are causing her intense feelings of dysphoria. She would benefit from bilateral browlift and hairline lowering procedures. Raising her lateral eyebrow with a bilateral browlift will create a more female appearance. She has stressed a strong desire to wear her own natural hair and does not want to always have to wear a wig to cover up her male features.\par \par 88447 (bilateral orbital reconstruction): The bone growth that occurred during testosterone exposure in the upper half of each orbital has caused this patient to have male appearing orbital features that contribute to the sense of dysphoria she feels in public. Orbital reconstruction with a reciprocating saw for an “L-shaped” ostectomy, on both sides will help remove the excessive bony protrusion; this will be followed by bone material grafting and resorbable plate fixation. The bilateral orbital reconstruction will help alleviate these orbital and upper facial male features.\par \par 95828 (Supraorbital bar contouring): This patient has orbital lateral hooding or overhang of her lateral frontal bone which is typically associated with the male skull and orbits. Supraorbital contouring of this lateral orbital region with a pineapple ramon will correct this male feature that is causing this patient dysphoria. These procedures also allows us to do a success browlift procedure since the overhang of bone can inhibit the upper movement of the brow and the removal of this allows for an effective lift.\par \par 70480 (mandibular angle resection/reduction): This patient has an angular, more boxy jaw which results in male appearing lower face. She also has increased lower facial width related to her mandibular angle lateral projection. Mandibular angle resection/reduction will create a more feminine triangular jaw. By having a mandibular angle reduction, the lower facial width is narrowed and this will create a “V-shaped”, feminine appearance of the jawline when viewed from the front.\par \par 86971 (osseous genioplasty): This patient has a wide, large chin that contributes to her feelings of gender dysphoria and being mis-gendered in public. The patient would benefit from an osseous genioplasty that narrows and shortens the chin. The osseous genioplasty will help create a more feminine and more, slender chin. \par \par 19366 (Rhinoplasty): This patient’s nose has characteristics of a male nose. The male nose is often larger and wider with a more bulbous nasal tip. These male nasal features make her feel masculine which exacerbates her gender dysphoria. A rhinoplasty would be beneficial to feminize her nose by creating a smaller nose and more delicate, softer nasal tip. The lateral dorsal shape will also be feminized by the rhinoplasty.\par \par 46125 (Submucous resection of nose): This patient’s nasal septum is shaped like a male septum. The septum is the supportive pole that holds up the structure of the nasal pyramid. In this case the septum will also be used to provide cartilage tissue necessary for nasal grafts. This septoplasty is required to modify the septum to create a straight nose with good functional breathing while taking away the characteristics of a male nose.\par \par 31922 (Submental fat removal): Testosterone exposure will build fibrofatty tissue in the submental region that is not corrected by hormone therapy. This patient has this fibrofatty tissue in the submental region which has created a masculine lateral profile appearance. Direct submental fibrofatty tissue excision is necessary to correct this male feature.\par \par 28569 (Platysmaplasty): With prolonged testosterone exposure, the submental region will appear full and ptotic. This patient has a full and ptotic submental and neck region which is associated with a male-appearing neck. The female neck is slender and tight. The platymaplasty, performed after submental fat excision, will help create a slender and tight, female appearing neck.\par \par 11310 (Upper lip augmentation): Females are shown to have san lips than males, therefore, an upper lip augmentation will create a more feminine appearance for this patient as she has currently has hypoplastic lips. Lip augmentation is also a procedure that not all patients need but we deem that this procedure is necessary for this patient as it well help alleviate her gender dysphoria. \par \par 92179-40 (bilateral masseteric muscle resection):  This patient has a wide lower face with active masseter muscles which results in male appearing lower face. Masseteric muscle resection on both sides will reduce the overall facial width and give a more feminine appearance. The female jawline is more tapered and triangular. \par \par 63470 (Tracheal shave or tracheolaryngoplasty): A prominent "Fco's Apple" is a feature associated with males. Not all trans-women have a prominent "Fco's Apple". However, this trans-woman has a prominent "Fco's Apple" (also known as laryngeal prominence). This is seen on lateral head position and when her head is raised to the maribel. It causes her intense feelings of dysphoria and it is a cause for misgendering. Therefore, the patient would benefit from a tracheal shave procedure which eliminates this prominent "Fco's Apple" associated with male appearance. \par \par 71838 (Fat grafting to Malar/temporal facial regions): This patient had prolonged testosterone exposure resulting in male facial features with depressed soft tissues in the cheeks and temporal region. More fullness in the cheek and temporal region may be created with fat grafting from the abdomen or hips to the cheek and temporal region creating a more full, femiinine appearance. The Baker fat grafting technique with atraumatic harvest and grafting leads to a 70% take of fat grafting to this region and is suggested. 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 She would benefit from implants lateral to the nose on both sides to establish a san appearance that softens the face so it will look less harsh. \par

## 2022-11-28 PROBLEM — Z00.00 ENCOUNTER FOR PREVENTIVE HEALTH EXAMINATION: Noted: 2022-11-28

## 2022-12-06 ENCOUNTER — OUTPATIENT (OUTPATIENT)
Dept: OUTPATIENT SERVICES | Facility: HOSPITAL | Age: 27
LOS: 1 days | End: 2022-12-06

## 2022-12-06 ENCOUNTER — RESULT REVIEW (OUTPATIENT)
Age: 27
End: 2022-12-06

## 2022-12-06 ENCOUNTER — APPOINTMENT (OUTPATIENT)
Dept: CT IMAGING | Facility: CLINIC | Age: 27
End: 2022-12-06

## 2023-07-31 ENCOUNTER — APPOINTMENT (OUTPATIENT)
Dept: PLASTIC SURGERY | Facility: CLINIC | Age: 28
End: 2023-07-31

## 2023-08-07 ENCOUNTER — TRANSCRIPTION ENCOUNTER (OUTPATIENT)
Age: 28
End: 2023-08-07

## 2023-08-07 VITALS
SYSTOLIC BLOOD PRESSURE: 120 MMHG | HEIGHT: 78 IN | DIASTOLIC BLOOD PRESSURE: 76 MMHG | HEART RATE: 69 BPM | WEIGHT: 184.97 LBS | RESPIRATION RATE: 16 BRPM | OXYGEN SATURATION: 98 % | TEMPERATURE: 97 F

## 2023-08-07 NOTE — PRE-OP CHECKLIST - HAND OFF
Please place order for repeat hepatic function panel and pt can f/u. We can review treatment options at that time   Holding RN to OR RN

## 2023-08-07 NOTE — PRE-OP CHECKLIST - AS BP NONINV SITE
**This refill requires provider completion and is not appropriate for RN review per RN refill guidelines.**  PH-Q9 needs to be less than 5 to approve medication on RN Refill protocol pt's score is 18.  Nel Jett RN       Detail Level: Detailed left upper arm Detail Level: Zone

## 2023-08-07 NOTE — PATIENT PROFILE ADULT - FALL HARM RISK - UNIVERSAL INTERVENTIONS
Bed in lowest position, wheels locked, appropriate side rails in place/Call bell, personal items and telephone in reach/Instruct patient to call for assistance before getting out of bed or chair/Non-slip footwear when patient is out of bed/[place] to call system/Physically safe environment - no spills, clutter or unnecessary equipment/Purposeful Proactive Rounding/Room/bathroom lighting operational, light cord in reach

## 2023-08-08 ENCOUNTER — INPATIENT (INPATIENT)
Facility: HOSPITAL | Age: 28
LOS: 1 days | Discharge: HOME | DRG: 876 | End: 2023-08-10
Attending: SURGERY | Admitting: SURGERY
Payer: MEDICAID

## 2023-08-08 ENCOUNTER — APPOINTMENT (OUTPATIENT)
Dept: PLASTIC SURGERY | Facility: HOSPITAL | Age: 28
End: 2023-08-08
Payer: COMMERCIAL

## 2023-08-08 PROCEDURE — 21256 RECONSTRUCTION OF ORBIT: CPT

## 2023-08-08 PROCEDURE — 21122 GENIOP SLDG OSTEOT 2/>: CPT

## 2023-08-08 PROCEDURE — 30410 RECONSTRUCTION OF NOSE: CPT

## 2023-08-08 PROCEDURE — 21209 REDUCTION OF FACIAL BONES: CPT

## 2023-08-08 PROCEDURE — 30520 REPAIR OF NASAL SEPTUM: CPT

## 2023-08-08 PROCEDURE — 15774 GFRG AUTOL FAT LIPO EA ADDL: CPT

## 2023-08-08 PROCEDURE — 21139 RDCTJ FOREHEAD CNTRG&SETBACK: CPT

## 2023-08-08 PROCEDURE — 21172 RCNST SUPR-LAT ORB RM&LW FHD: CPT

## 2023-08-08 PROCEDURE — 31899 UNLISTED PX TRACHEA BRONCHI: CPT

## 2023-08-08 PROCEDURE — 21208 AUGMENTATION OF FACIAL BONES: CPT | Mod: 50

## 2023-08-08 PROCEDURE — 15824 RHYTIDECTOMY FOREHEAD: CPT

## 2023-08-08 PROCEDURE — 15773 GRFG AUTOL FAT LIPO 25 CC/<: CPT

## 2023-08-08 DEVICE — SCREW CR DRV 2.0X11MM: Type: IMPLANTABLE DEVICE | Status: FUNCTIONAL

## 2023-08-08 DEVICE — MESH RESORB RXG 51X51MM 0.6MM: Type: IMPLANTABLE DEVICE | Status: FUNCTIONAL

## 2023-08-08 DEVICE — GUIDE MARKING CRANIAL: Type: IMPLANTABLE DEVICE | Status: FUNCTIONAL

## 2023-08-08 DEVICE — SCREW EMERG CROSS DRIVE TI 2X9: Type: IMPLANTABLE DEVICE | Status: FUNCTIONAL

## 2023-08-08 DEVICE — SCREW BONE RESORB 2.1X4MM SONIC PIN: Type: IMPLANTABLE DEVICE | Status: FUNCTIONAL

## 2023-08-08 DEVICE — IMPLANTABLE DEVICE: Type: IMPLANTABLE DEVICE | Status: FUNCTIONAL

## 2023-08-08 DEVICE — IMP CUSTOM IPS TI 67MM: Type: IMPLANTABLE DEVICE | Status: FUNCTIONAL

## 2023-08-08 DEVICE — SCREW MAXDRIVE 1 LVL 2X7MM: Type: IMPLANTABLE DEVICE | Status: FUNCTIONAL

## 2023-08-08 DEVICE — SCREW DRILL FREE 1.5X7MM: Type: IMPLANTABLE DEVICE | Status: FUNCTIONAL

## 2023-08-08 DEVICE — GUIDE MARKING W/PLAN TRANSFORM: Type: IMPLANTABLE DEVICE | Status: FUNCTIONAL

## 2023-08-08 DEVICE — GUIDE CUTTING TRANSFORM XS: Type: IMPLANTABLE DEVICE | Status: FUNCTIONAL

## 2023-08-08 DEVICE — PIN SONIC RX 2.1X4MM: Type: IMPLANTABLE DEVICE | Status: FUNCTIONAL

## 2023-08-08 RX ORDER — OXYCODONE HYDROCHLORIDE 5 MG/1
5 TABLET ORAL EVERY 4 HOURS
Refills: 0 | Status: DISCONTINUED | OUTPATIENT
Start: 2023-08-08 | End: 2023-08-10

## 2023-08-08 RX ORDER — ACETAMINOPHEN 500 MG
1000 TABLET ORAL EVERY 6 HOURS
Refills: 0 | Status: COMPLETED | OUTPATIENT
Start: 2023-08-08 | End: 2023-08-09

## 2023-08-08 RX ORDER — FAMOTIDINE 10 MG/ML
20 INJECTION INTRAVENOUS
Refills: 0 | Status: DISCONTINUED | OUTPATIENT
Start: 2023-08-08 | End: 2023-08-08

## 2023-08-08 RX ORDER — CHLORHEXIDINE GLUCONATE 213 G/1000ML
15 SOLUTION TOPICAL EVERY 12 HOURS
Refills: 0 | Status: DISCONTINUED | OUTPATIENT
Start: 2023-08-08 | End: 2023-08-08

## 2023-08-08 RX ORDER — HYDROMORPHONE HYDROCHLORIDE 2 MG/ML
0.5 INJECTION INTRAMUSCULAR; INTRAVENOUS; SUBCUTANEOUS
Refills: 0 | Status: DISCONTINUED | OUTPATIENT
Start: 2023-08-08 | End: 2023-08-10

## 2023-08-08 RX ORDER — DIAZEPAM 5 MG
5 TABLET ORAL EVERY 8 HOURS
Refills: 0 | Status: DISCONTINUED | OUTPATIENT
Start: 2023-08-08 | End: 2023-08-10

## 2023-08-08 RX ORDER — METOCLOPRAMIDE HCL 10 MG
10 TABLET ORAL EVERY 8 HOURS
Refills: 0 | Status: DISCONTINUED | OUTPATIENT
Start: 2023-08-08 | End: 2023-08-10

## 2023-08-08 RX ORDER — SENNA PLUS 8.6 MG/1
2 TABLET ORAL AT BEDTIME
Refills: 0 | Status: DISCONTINUED | OUTPATIENT
Start: 2023-08-08 | End: 2023-08-10

## 2023-08-08 RX ORDER — CEFAZOLIN SODIUM 1 G
2000 VIAL (EA) INJECTION EVERY 8 HOURS
Refills: 0 | Status: DISCONTINUED | OUTPATIENT
Start: 2023-08-08 | End: 2023-08-08

## 2023-08-08 RX ORDER — DEXAMETHASONE 0.5 MG/5ML
10 ELIXIR ORAL EVERY 12 HOURS
Refills: 0 | Status: DISCONTINUED | OUTPATIENT
Start: 2023-08-08 | End: 2023-08-10

## 2023-08-08 RX ORDER — CHLORHEXIDINE GLUCONATE 213 G/1000ML
15 SOLUTION TOPICAL EVERY 12 HOURS
Refills: 0 | Status: DISCONTINUED | OUTPATIENT
Start: 2023-08-08 | End: 2023-08-10

## 2023-08-08 RX ORDER — BENZOCAINE AND MENTHOL 5; 1 G/100ML; G/100ML
1 LIQUID ORAL THREE TIMES A DAY
Refills: 0 | Status: DISCONTINUED | OUTPATIENT
Start: 2023-08-08 | End: 2023-08-10

## 2023-08-08 RX ORDER — SPIRONOLACTONE 25 MG/1
1 TABLET, FILM COATED ORAL
Refills: 0 | DISCHARGE

## 2023-08-08 RX ORDER — CYPROHEPTADINE HYDROCHLORIDE 4 MG/1
1 TABLET ORAL
Refills: 0 | DISCHARGE

## 2023-08-08 RX ORDER — ONDANSETRON 8 MG/1
4 TABLET, FILM COATED ORAL EVERY 6 HOURS
Refills: 0 | Status: DISCONTINUED | OUTPATIENT
Start: 2023-08-08 | End: 2023-08-10

## 2023-08-08 RX ORDER — CEFAZOLIN SODIUM 1 G
2000 VIAL (EA) INJECTION EVERY 8 HOURS
Refills: 0 | Status: DISCONTINUED | OUTPATIENT
Start: 2023-08-08 | End: 2023-08-10

## 2023-08-08 RX ORDER — BICTEGRAVIR SODIUM, EMTRICITABINE, AND TENOFOVIR ALAFENAMIDE FUMARATE 30; 120; 15 MG/1; MG/1; MG/1
1 TABLET ORAL DAILY
Refills: 0 | Status: DISCONTINUED | OUTPATIENT
Start: 2023-08-08 | End: 2023-08-10

## 2023-08-08 RX ORDER — CALCIUM CARBONATE 500(1250)
1 TABLET ORAL EVERY 4 HOURS
Refills: 0 | Status: DISCONTINUED | OUTPATIENT
Start: 2023-08-08 | End: 2023-08-10

## 2023-08-08 RX ORDER — SPIRONOLACTONE 25 MG/1
50 TABLET, FILM COATED ORAL DAILY
Refills: 0 | Status: DISCONTINUED | OUTPATIENT
Start: 2023-08-08 | End: 2023-08-10

## 2023-08-08 RX ORDER — GABAPENTIN 400 MG/1
300 CAPSULE ORAL
Refills: 0 | Status: DISCONTINUED | OUTPATIENT
Start: 2023-08-08 | End: 2023-08-10

## 2023-08-08 RX ORDER — OXYCODONE HYDROCHLORIDE 5 MG/1
10 TABLET ORAL EVERY 4 HOURS
Refills: 0 | Status: DISCONTINUED | OUTPATIENT
Start: 2023-08-08 | End: 2023-08-10

## 2023-08-08 RX ORDER — DIPHENHYDRAMINE HCL 50 MG
25 CAPSULE ORAL EVERY 6 HOURS
Refills: 0 | Status: DISCONTINUED | OUTPATIENT
Start: 2023-08-08 | End: 2023-08-10

## 2023-08-08 RX ORDER — DIPHENHYDRAMINE HCL 50 MG
25 CAPSULE ORAL EVERY 4 HOURS
Refills: 0 | Status: DISCONTINUED | OUTPATIENT
Start: 2023-08-08 | End: 2023-08-08

## 2023-08-08 RX ORDER — BICTEGRAVIR SODIUM, EMTRICITABINE, AND TENOFOVIR ALAFENAMIDE FUMARATE 30; 120; 15 MG/1; MG/1; MG/1
1 TABLET ORAL
Refills: 0 | DISCHARGE

## 2023-08-08 RX ORDER — FAMOTIDINE 10 MG/ML
20 INJECTION INTRAVENOUS EVERY 12 HOURS
Refills: 0 | Status: DISCONTINUED | OUTPATIENT
Start: 2023-08-08 | End: 2023-08-10

## 2023-08-08 RX ORDER — SODIUM CHLORIDE 9 MG/ML
1000 INJECTION, SOLUTION INTRAVENOUS
Refills: 0 | Status: DISCONTINUED | OUTPATIENT
Start: 2023-08-08 | End: 2023-08-09

## 2023-08-08 RX ADMIN — OXYCODONE HYDROCHLORIDE 10 MILLIGRAM(S): 5 TABLET ORAL at 21:26

## 2023-08-08 RX ADMIN — HYDROMORPHONE HYDROCHLORIDE 0.5 MILLIGRAM(S): 2 INJECTION INTRAMUSCULAR; INTRAVENOUS; SUBCUTANEOUS at 16:12

## 2023-08-08 RX ADMIN — ONDANSETRON 4 MILLIGRAM(S): 8 TABLET, FILM COATED ORAL at 18:50

## 2023-08-08 RX ADMIN — OXYCODONE HYDROCHLORIDE 10 MILLIGRAM(S): 5 TABLET ORAL at 22:29

## 2023-08-08 RX ADMIN — Medication 5 MILLIGRAM(S): at 23:24

## 2023-08-08 RX ADMIN — Medication 1000 MILLIGRAM(S): at 18:09

## 2023-08-08 RX ADMIN — Medication 10 MILLIGRAM(S): at 19:14

## 2023-08-08 RX ADMIN — HYDROMORPHONE HYDROCHLORIDE 0.5 MILLIGRAM(S): 2 INJECTION INTRAMUSCULAR; INTRAVENOUS; SUBCUTANEOUS at 17:11

## 2023-08-08 RX ADMIN — Medication 100 MILLIGRAM(S): at 22:33

## 2023-08-08 RX ADMIN — Medication 400 MILLIGRAM(S): at 17:51

## 2023-08-08 RX ADMIN — SODIUM CHLORIDE 75 MILLILITER(S): 9 INJECTION, SOLUTION INTRAVENOUS at 15:16

## 2023-08-08 RX ADMIN — Medication 102 MILLIGRAM(S): at 18:10

## 2023-08-08 NOTE — PRE-ANESTHESIA EVALUATION ADULT - NSANTHOSAYNRD_GEN_A_CORE
No. GUERA screening performed.  STOP BANG Legend: 0-2 = LOW Risk; 3-4 = INTERMEDIATE Risk; 5-8 = HIGH Risk

## 2023-08-08 NOTE — BRIEF OPERATIVE NOTE - OPERATION/FINDINGS
Prominent brow and lateral orbital rim type III; wide chin; prominent mandibular angles; wide nose with bulbous nasal tip and acute nasolabial angles, prominent tracheal bulge, excess submental fat, hypoplastic cheeks.

## 2023-08-08 NOTE — BRIEF OPERATIVE NOTE - NSICDXBRIEFPROCEDURE_GEN_ALL_CORE_FT
PROCEDURES:  Contouring, forehead, anterior frontal sinus wall, for reduction and setback 08-Aug-2023 13:56:26  Zoila Bautista  Browlift 08-Aug-2023 13:56:29  Zoila Bautista  Reduction genioplasty 08-Aug-2023 13:56:33  Zoila Bautista  Open mandibular angle osteotomy 08-Aug-2023 13:56:37  Zoila Bautista  Orbital reconstruction 08-Aug-2023 13:56:46  Zoila Bautista  SMR - Submucous resection 08-Aug-2023 13:56:52  Zoila Bautista  Complete rhinoplasty 08-Aug-2023 13:57:00  Zoila Bautista  Rhinoplasty using cartilage graft 08-Aug-2023 13:57:07  Zoila Bautista  Chondrolaryngoplasty 08-Aug-2023 13:57:11  Zoila Bautista  Excision, submental fat pad excessive skin and subcutaneous tissue 08-Aug-2023 13:57:23  Zoila Bautista

## 2023-08-09 ENCOUNTER — TRANSCRIPTION ENCOUNTER (OUTPATIENT)
Age: 28
End: 2023-08-09

## 2023-08-09 LAB — GLUCOSE BLDC GLUCOMTR-MCNC: 126 MG/DL — HIGH (ref 70–99)

## 2023-08-09 RX ORDER — GABAPENTIN 400 MG/1
1 CAPSULE ORAL
Qty: 10 | Refills: 0
Start: 2023-08-09 | End: 2023-08-13

## 2023-08-09 RX ORDER — OXYCODONE HYDROCHLORIDE 5 MG/1
1 TABLET ORAL
Qty: 28 | Refills: 0
Start: 2023-08-09 | End: 2023-08-15

## 2023-08-09 RX ORDER — IBUPROFEN 200 MG
1 TABLET ORAL
Qty: 28 | Refills: 0
Start: 2023-08-09 | End: 2023-08-15

## 2023-08-09 RX ORDER — CHLORHEXIDINE GLUCONATE 213 G/1000ML
15 SOLUTION TOPICAL
Qty: 1 | Refills: 0
Start: 2023-08-09 | End: 2023-08-15

## 2023-08-09 RX ORDER — OXYCODONE HYDROCHLORIDE 5 MG/1
5 TABLET ORAL ONCE
Refills: 0 | Status: DISCONTINUED | OUTPATIENT
Start: 2023-08-09 | End: 2023-08-09

## 2023-08-09 RX ADMIN — OXYCODONE HYDROCHLORIDE 10 MILLIGRAM(S): 5 TABLET ORAL at 15:30

## 2023-08-09 RX ADMIN — BICTEGRAVIR SODIUM, EMTRICITABINE, AND TENOFOVIR ALAFENAMIDE FUMARATE 1 TABLET(S): 30; 120; 15 TABLET ORAL at 11:37

## 2023-08-09 RX ADMIN — Medication 100 MILLIGRAM(S): at 07:30

## 2023-08-09 RX ADMIN — CHLORHEXIDINE GLUCONATE 15 MILLILITER(S): 213 SOLUTION TOPICAL at 06:01

## 2023-08-09 RX ADMIN — OXYCODONE HYDROCHLORIDE 10 MILLIGRAM(S): 5 TABLET ORAL at 03:30

## 2023-08-09 RX ADMIN — Medication 5 MILLIGRAM(S): at 20:36

## 2023-08-09 RX ADMIN — OXYCODONE HYDROCHLORIDE 10 MILLIGRAM(S): 5 TABLET ORAL at 16:30

## 2023-08-09 RX ADMIN — Medication 100 MILLIGRAM(S): at 15:23

## 2023-08-09 RX ADMIN — Medication 102 MILLIGRAM(S): at 18:06

## 2023-08-09 RX ADMIN — OXYCODONE HYDROCHLORIDE 5 MILLIGRAM(S): 5 TABLET ORAL at 07:37

## 2023-08-09 RX ADMIN — GABAPENTIN 300 MILLIGRAM(S): 400 CAPSULE ORAL at 06:01

## 2023-08-09 RX ADMIN — GABAPENTIN 300 MILLIGRAM(S): 400 CAPSULE ORAL at 18:05

## 2023-08-09 RX ADMIN — OXYCODONE HYDROCHLORIDE 5 MILLIGRAM(S): 5 TABLET ORAL at 19:05

## 2023-08-09 RX ADMIN — Medication 100 MILLIGRAM(S): at 23:13

## 2023-08-09 RX ADMIN — FAMOTIDINE 20 MILLIGRAM(S): 10 INJECTION INTRAVENOUS at 06:00

## 2023-08-09 RX ADMIN — CHLORHEXIDINE GLUCONATE 15 MILLILITER(S): 213 SOLUTION TOPICAL at 18:06

## 2023-08-09 RX ADMIN — SENNA PLUS 2 TABLET(S): 8.6 TABLET ORAL at 22:26

## 2023-08-09 RX ADMIN — Medication 400 MILLIGRAM(S): at 11:38

## 2023-08-09 RX ADMIN — OXYCODONE HYDROCHLORIDE 5 MILLIGRAM(S): 5 TABLET ORAL at 18:05

## 2023-08-09 RX ADMIN — OXYCODONE HYDROCHLORIDE 10 MILLIGRAM(S): 5 TABLET ORAL at 04:30

## 2023-08-09 RX ADMIN — FAMOTIDINE 20 MILLIGRAM(S): 10 INJECTION INTRAVENOUS at 18:06

## 2023-08-09 RX ADMIN — Medication 400 MILLIGRAM(S): at 06:40

## 2023-08-09 RX ADMIN — Medication 102 MILLIGRAM(S): at 06:02

## 2023-08-09 RX ADMIN — OXYCODONE HYDROCHLORIDE 5 MILLIGRAM(S): 5 TABLET ORAL at 06:37

## 2023-08-09 RX ADMIN — Medication 5 MILLIGRAM(S): at 11:40

## 2023-08-09 RX ADMIN — SPIRONOLACTONE 50 MILLIGRAM(S): 25 TABLET, FILM COATED ORAL at 06:01

## 2023-08-09 RX ADMIN — Medication 400 MILLIGRAM(S): at 00:07

## 2023-08-09 RX ADMIN — BENZOCAINE AND MENTHOL 1 LOZENGE: 5; 1 LIQUID ORAL at 22:26

## 2023-08-09 NOTE — DISCHARGE NOTE PROVIDER - NSDCFUSCHEDAPPT_GEN_ALL_CORE_FT
Imtiaz Singh  [facility] Physician [facility]  [facility] [address] [place] [address]  Scheduled Appointment: 08/18/2023

## 2023-08-09 NOTE — DISCHARGE NOTE PROVIDER - NSDCMRMEDTOKEN_GEN_ALL_CORE_FT
Biktarvy 50 mg-200 mg-25 mg oral tablet: 1 orally once a day  cyproheptadine 4 mg oral tablet: 1 orally 2 times a day  Delestrogen 40 mg/mL intramuscular solution: 20 intramuscularly every 2 weeks  gabapentin 300 mg oral capsule: 1 cap(s) orally 2 times a day  ibuprofen 600 mg oral tablet: 1 tab(s) orally every 6 hours Please take as needed for moderate pain. MDD: 4  oxyCODONE 5 mg oral tablet: 1 tab(s) orally every 6 hours Please take as needed for severe pain. MDD: 4  Peridex 0.12% mucous membrane liquid: 15 milliliter(s) orally 3 times a day Please rinse after meals, 3 times per day.  spironolactone 50 mg oral tablet: 1 orally once a day

## 2023-08-09 NOTE — DISCHARGE NOTE PROVIDER - NSDCFUADDINST_GEN_ALL_CORE_FT
Please continue to wear your jaw bra 24/7 until your follow-up appointment with Dr. Singh.    You have been prescribed medications, please take them as prescribed.    Please continue to rinse your mouth with Peridex after each meal.    Warning Signs:  Please call your doctor if you experience the following:  *You experience new chest pain, pressure, squeezing or tightness.  *New or worsening cough, shortness of breath, or wheeze.  *If you are vomiting and cannot keep down fluids or your medications.  *You are getting dehydrated due to continued vomiting, diarrhea, or other reasons. Signs of dehydration include dry mouth, rapid heartbeat, or feeling dizzy or faint when standing.  *You see blood or dark/black material when you vomit or have a bowel movement.  *You experience burning when you urinate, have blood in your urine, or experience a discharge.  *Your pain is not improving within 8-12 hours or is not gone within 24 hours. Call or return immediately if your pain is getting worse, changes location, or moves to your chest or back.  *You have shaking chills, or fever greater than 101.5 degrees Fahrenheit or 38 degrees Celsius.  *Any change in your symptoms, or any new symptoms that concern you.     Please continue to wear your jaw bra 24/7 until your follow-up appointment with Dr. Singh.    Please do not get your face wet. You can wash your hair in the sink.    You have been prescribed medications, please take them as prescribed.    Please continue to rinse your mouth with Peridex after each meal.    Warning Signs:  Please call your doctor if you experience the following:  *You experience new chest pain, pressure, squeezing or tightness.  *New or worsening cough, shortness of breath, or wheeze.  *If you are vomiting and cannot keep down fluids or your medications.  *You are getting dehydrated due to continued vomiting, diarrhea, or other reasons. Signs of dehydration include dry mouth, rapid heartbeat, or feeling dizzy or faint when standing.  *You see blood or dark/black material when you vomit or have a bowel movement.  *You experience burning when you urinate, have blood in your urine, or experience a discharge.  *Your pain is not improving within 8-12 hours or is not gone within 24 hours. Call or return immediately if your pain is getting worse, changes location, or moves to your chest or back.  *You have shaking chills, or fever greater than 101.5 degrees Fahrenheit or 38 degrees Celsius.  *Any change in your symptoms, or any new symptoms that concern you.

## 2023-08-09 NOTE — DISCHARGE NOTE PROVIDER - CARE PROVIDER_API CALL
Imtiaz Singh  Plastic Surgery  [address], [address]  [place] [address]  Phone: [phone]  Fax: [phone]  Follow Up Time:

## 2023-08-09 NOTE — DISCHARGE NOTE PROVIDER - HOSPITAL COURSE
Pt is a 28F with PMH gender dysphoria admitted for facial feminization surgery including tracheal shave, frontal sinus contouring, browlift, genioplasty, mandibular angle osteotomy, cheek implants and rhinoplasty on 8/8. Pt had an uncomplicated surgery. Post-operatively, pt passed trial of void, had adequate pain control with appropriate advancement of diet. On day of discharge patient was stable to be d/c'd home.

## 2023-08-09 NOTE — PROGRESS NOTE ADULT - ASSESSMENT
Pt is 28F POD1 s/p facial feminization surgery. Pt doing well with appropriate swelling, tolerating liquid diet.    -Full liquids  -Pain control  -Vallium for anxiety  -dressing change tomorrow  -dc home tomorrow

## 2023-08-09 NOTE — PROGRESS NOTE ADULT - SUBJECTIVE AND OBJECTIVE BOX
STATUS POST:  Contouring, forehead, anterior frontal sinus wall, for reduction and setback    Browlift    Reduction genioplasty    Open mandibular angle osteotomy    Orbital reconstruction    SMR - Submucous resection    Complete rhinoplasty    Rhinoplasty using cartilage graft    Chondrolaryngoplasty    Excision, submental fat pad excessive skin and subcutaneous tissue      POST OPERATIVE DAY #: 1    SUBJECTIVE: Pt seen and evaluated at bedside by the chief resident on am rounds. Pt had no acute events overnight. Pt with some anxiety regarding swelling.     Vital Signs Last 24 Hrs  T(C): 36.7 (09 Aug 2023 05:09), Max: 36.7 (09 Aug 2023 05:09)  T(F): 98 (09 Aug 2023 05:09), Max: 98 (09 Aug 2023 05:09)  HR: 67 (09 Aug 2023 05:09) (50 - 78)  BP: 131/67 (09 Aug 2023 05:09) (97/56 - 141/90)  BP(mean): 113 (08 Aug 2023 19:03) (72 - 113)  RR: 18 (09 Aug 2023 05:09) (10 - 29)  SpO2: 97% (09 Aug 2023 05:09) (91% - 99%)    Parameters below as of 09 Aug 2023 05:09  Patient On (Oxygen Delivery Method): room air        I&O's Summary    08 Aug 2023 07:01  -  09 Aug 2023 07:00  --------------------------------------------------------  IN: 675 mL / OUT: 1325 mL / NET: -650 mL        Physical Exam:  General Appearance: NAD  Pulmonary: Nonlabored breathing, no respiratory distress  Cardiovascular: NSR  Abdomen: 3 incisions are clean, dry and intact  Extremities: SCD's in place   HEENT: appropriate swelling around eyes, mouth and cheeks. Incisions are clean, dry and intact. Pt able to open eyes well and speak. DELMER drain in place with serosanguinous output.

## 2023-08-10 ENCOUNTER — TRANSCRIPTION ENCOUNTER (OUTPATIENT)
Age: 28
End: 2023-08-10

## 2023-08-10 VITALS
DIASTOLIC BLOOD PRESSURE: 78 MMHG | SYSTOLIC BLOOD PRESSURE: 135 MMHG | OXYGEN SATURATION: 95 % | RESPIRATION RATE: 18 BRPM | TEMPERATURE: 98 F | HEART RATE: 85 BPM

## 2023-08-10 PROCEDURE — 86850 RBC ANTIBODY SCREEN: CPT

## 2023-08-10 PROCEDURE — 86900 BLOOD TYPING SEROLOGIC ABO: CPT

## 2023-08-10 PROCEDURE — C9143: CPT

## 2023-08-10 PROCEDURE — C1713: CPT

## 2023-08-10 PROCEDURE — 82962 GLUCOSE BLOOD TEST: CPT

## 2023-08-10 PROCEDURE — C1889: CPT

## 2023-08-10 PROCEDURE — 86901 BLOOD TYPING SEROLOGIC RH(D): CPT

## 2023-08-10 RX ADMIN — CHLORHEXIDINE GLUCONATE 15 MILLILITER(S): 213 SOLUTION TOPICAL at 05:54

## 2023-08-10 RX ADMIN — FAMOTIDINE 20 MILLIGRAM(S): 10 INJECTION INTRAVENOUS at 05:54

## 2023-08-10 RX ADMIN — SPIRONOLACTONE 50 MILLIGRAM(S): 25 TABLET, FILM COATED ORAL at 05:54

## 2023-08-10 RX ADMIN — OXYCODONE HYDROCHLORIDE 10 MILLIGRAM(S): 5 TABLET ORAL at 02:53

## 2023-08-10 RX ADMIN — Medication 100 MILLIGRAM(S): at 07:12

## 2023-08-10 RX ADMIN — OXYCODONE HYDROCHLORIDE 10 MILLIGRAM(S): 5 TABLET ORAL at 01:53

## 2023-08-10 RX ADMIN — GABAPENTIN 300 MILLIGRAM(S): 400 CAPSULE ORAL at 05:54

## 2023-08-10 RX ADMIN — Medication 102 MILLIGRAM(S): at 05:55

## 2023-08-10 RX ADMIN — BENZOCAINE AND MENTHOL 1 LOZENGE: 5; 1 LIQUID ORAL at 05:57

## 2023-08-10 NOTE — DISCHARGE NOTE NURSING/CASE MANAGEMENT/SOCIAL WORK - NSDCPEFALRISK_GEN_ALL_CORE
For information on Fall & Injury Prevention, visit: [email or web address] OR  [email or web address] OR  https://www.cdc.gov/steadi/patient.html

## 2023-08-10 NOTE — PROGRESS NOTE ADULT - ASSESSMENT
Pt is 28F POD1 s/p facial feminization surgery. Pt doing well with appropriate swelling, tolerating liquid diet.    -Full liquids  -Pain control  -Dc home today

## 2023-08-10 NOTE — DISCHARGE NOTE NURSING/CASE MANAGEMENT/SOCIAL WORK - PATIENT PORTAL LINK FT
You can access the FollowMyHealth Patient Portal offered by [facility] by registering at the following website: [email or web address]. By joining [employer]s FollowMyHealth portal, you will also be able to view your health information using other applications (apps) compatible with our system.

## 2023-08-10 NOTE — PROGRESS NOTE ADULT - SUBJECTIVE AND OBJECTIVE BOX
STATUS POST:  Contouring, forehead, anterior frontal sinus wall, for reduction and setback    Browlift    Reduction genioplasty    Open mandibular angle osteotomy    Orbital reconstruction    SMR - Submucous resection    Complete rhinoplasty    Rhinoplasty using cartilage graft    Chondrolaryngoplasty    Excision, submental fat pad excessive skin and subcutaneous tissue    POST OPERATIVE DAY #: 2    SUBJECTIVE: Pt doing well with LOW overnight. Pt tolerating diet well and has adequate pain control.    Vital Signs Last 24 Hrs  T(C): 36.5 (10 Aug 2023 04:54), Max: 36.8 (09 Aug 2023 08:34)  T(F): 97.7 (10 Aug 2023 04:54), Max: 98.2 (09 Aug 2023 08:34)  HR: 60 (10 Aug 2023 04:54) (55 - 75)  BP: 108/65 (10 Aug 2023 04:54) (108/65 - 134/86)  BP(mean): --  RR: 17 (10 Aug 2023 04:54) (16 - 18)  SpO2: 97% (10 Aug 2023 04:54) (96% - 98%)    Parameters below as of 10 Aug 2023 04:54  Patient On (Oxygen Delivery Method): room air        I&O's Summary    09 Aug 2023 07:01  -  10 Aug 2023 07:00  --------------------------------------------------------  IN: 320 mL / OUT: 2405 mL / NET: -2085 mL        Physical Exam:  General Appearance: Appears well, NAD  Pulmonary: Nonlabored breathing, no respiratory distress  Cardiovascular: NSR  Abdomen: Incisions clean, dry and intact  Extremities: SCD's in place   HEENT: dressing changed, DELMER drain removed. New jaw bra given. All incisions clean, dry and intact. Appropriate post-operative swelling

## 2023-08-16 DIAGNOSIS — F64.0 TRANSSEXUALISM: ICD-10-CM

## 2023-08-16 DIAGNOSIS — Z79.899 OTHER LONG TERM (CURRENT) DRUG THERAPY: ICD-10-CM

## 2023-08-18 ENCOUNTER — APPOINTMENT (OUTPATIENT)
Dept: PLASTIC SURGERY | Facility: CLINIC | Age: 28
End: 2023-08-18

## 2023-08-18 VITALS
TEMPERATURE: 98.1 F | SYSTOLIC BLOOD PRESSURE: 127 MMHG | OXYGEN SATURATION: 99 % | WEIGHT: 185 LBS | BODY MASS INDEX: 21.4 KG/M2 | HEART RATE: 91 BPM | HEIGHT: 78 IN | DIASTOLIC BLOOD PRESSURE: 58 MMHG

## 2023-08-18 DIAGNOSIS — F64.9 GENDER IDENTITY DISORDER, UNSPECIFIED: ICD-10-CM

## 2023-08-18 DIAGNOSIS — Z09 ENCOUNTER FOR FOLLOW-UP EXAMINATION AFTER COMPLETED TREATMENT FOR CONDITIONS OTHER THAN MALIGNANT NEOPLASM: ICD-10-CM

## 2023-08-18 RX ORDER — IBUPROFEN 600 MG/1
600 TABLET, FILM COATED ORAL
Qty: 28 | Refills: 0 | Status: ACTIVE | COMMUNITY
Start: 2023-08-18 | End: 1900-01-01

## 2023-08-18 RX ORDER — GABAPENTIN 300 MG/1
300 CAPSULE ORAL
Qty: 10 | Refills: 0 | Status: ACTIVE | COMMUNITY
Start: 2023-08-18 | End: 1900-01-01

## 2023-08-18 NOTE — ASSESSMENT
[FreeTextEntry1] : DONTA is a 28 year old transgender female patient who presents to the office today for a post operative evaluation status post facial feminization surgery on 8/8/23. Patient is healing appropriately. Visible non-dissolvable nasal sutures and nasal splint were removed today. Some chin numbness present. Instructions reviewed and questions were addressed.   - Sleep with head elevated at 30 degrees to reduce swelling   - Continue to wear jaw bra 24/7 to combat swelling   - Ibuprofen 400mg for pain relief as needed   - You may begin to shower and wash hair, gently pat hairline incision site dry   - Use hydrogen peroxide and a Q-tip to soften up eschars/scabs   - Avoid sun exposure of incision sites   - Continue soft diet x 1 week.   - Use saline nasal spray twice a day   - Use Afrin nasal spray twice a day but discontinue after 3 days   - Contact us for any questions or concerns.  - monitor for signs of infection   - Follow up in 2 weeks

## 2023-08-21 PROBLEM — F64.9 GENDER IDENTITY DISORDER, UNSPECIFIED: Chronic | Status: ACTIVE | Noted: 2023-08-07

## 2023-08-21 PROBLEM — B20 HUMAN IMMUNODEFICIENCY VIRUS [HIV] DISEASE: Chronic | Status: ACTIVE | Noted: 2023-08-08

## 2023-08-21 NOTE — PHYSICAL EXAM
[de-identified] : alert, calm, cooperative  [de-identified] : Coronal incision is well approximated with no signs of wound dehiscence or fluctuance. moderate edema and ecchymosis noted [de-identified] : Oral and nasal incisions are well approximated with no signs of wound dehiscence or fluctuance. Moderate edema, swelling, and ecchymosis noted. Bilateral malar ecchymosis present. [de-identified] : Submental Incision well approximated and healing appropriately with no signs of purulence or dehiscence  [de-identified] : Respirations are even and unlabored  [de-identified] : Incisions well approximated with no signs of infection

## 2023-08-21 NOTE — HISTORY OF PRESENT ILLNESS
[FreeTextEntry1] : DONTA is a 28 year old transgender female who presents to the office today for a postoperative evaluation status post facial feminization surgery with: frontal sinus setback, bilateral orbital reconstruction, bilateral brow lift and hairline lowering, bilateral supraorbital contouring, open rhinoplasty procedure, submucous resection of septum, submental fat excision and buccal fat excision, platysmaplasty, neck tightening, tracheal shave, bilateral cheek augmentation with implants, fat grafting from abdomen to bilateral malar temporal regions, bilateral masseter resection, osseous genioplasty narrowing, shortening, advancement, mandibular angle reduction, resection and contouring on 8/8/23. Patient denies any significant concerns or complaints.

## 2023-09-01 ENCOUNTER — APPOINTMENT (OUTPATIENT)
Dept: PLASTIC SURGERY | Facility: CLINIC | Age: 28
End: 2023-09-01

## 2025-06-16 ENCOUNTER — APPOINTMENT (OUTPATIENT)
Dept: PLASTIC SURGERY | Facility: CLINIC | Age: 30
End: 2025-06-16

## 2025-06-16 VITALS — BODY MASS INDEX: 22.21 KG/M2 | HEIGHT: 78 IN | WEIGHT: 192 LBS

## 2025-06-16 PROCEDURE — 99214 OFFICE O/P EST MOD 30 MIN: CPT

## (undated) DEVICE — MODEL IPS TRANSFORM

## (undated) DEVICE — BATTERY PACK KLS MARTIN SINGLE USE

## (undated) DEVICE — SUT VICRYL 2-0 18" CP-2 UNDYED (POP-OFF)

## (undated) DEVICE — DRILL BIT KLS MARTIN TWIST STOP 1.6X40MM

## (undated) DEVICE — Device

## (undated) DEVICE — SUT ETHILON 5-0 18" PS-2

## (undated) DEVICE — DRSG GAUZE MOISTURIZER 0.5 OZ 4X8

## (undated) DEVICE — DRSG AQUAPLAST BLANK BLUSH 3 X 3"

## (undated) DEVICE — ELCTR BOVIE TIP BLADE VALLEYLAB 6.5"

## (undated) DEVICE — SUCTION YANKAUER BULBOUS TIP NO VENT

## (undated) DEVICE — RASP STRYKER LARGE TEAR CROSSCUT 14X7MM DISP

## (undated) DEVICE — SUT PLAIN GUT FAST ABSORBING 5-0 PC-1

## (undated) DEVICE — DRSG STERISTRIPS 0.5 X 4"

## (undated) DEVICE — BIPOLAR FORCEP SYMMETRY BAYONET STR 8.25" X 1.5MM (BLUE)

## (undated) DEVICE — BLADE SURGICAL #10 CARBON

## (undated) DEVICE — TAPE SILK 3"

## (undated) DEVICE — NDL 18G BLUNT FILL PINK

## (undated) DEVICE — SYR LUER LOK 10CC

## (undated) DEVICE — DRAIN RESERVOIR FOR JACKSON PRATT 100CC CARDINAL

## (undated) DEVICE — SUT CHROMIC 3-0 27" PS-2

## (undated) DEVICE — SUT SILK 2-0 30" PSL

## (undated) DEVICE — DRAIN BLAKE 10FR ROUND

## (undated) DEVICE — FOLEY TRAY 16FR 5CC LF UMETER CLOSED

## (undated) DEVICE — APPLICATOR Q TIP 6" WOOD STEM

## (undated) DEVICE — DRSG TELFA 3 X 8

## (undated) DEVICE — NDL HYPO SAFE 25G X 1.5" (ORANGE)

## (undated) DEVICE — DRAPE MAGNETIC INSTRUMENT MEDIUM

## (undated) DEVICE — SOL ANTI FOG

## (undated) DEVICE — SYR LUER LOK 5CC

## (undated) DEVICE — SYR LUER LOK 1CC

## (undated) DEVICE — POLISHER OR CAUTERY TIP

## (undated) DEVICE — SAW BLADE STRYKER RECIPROCATING 27MMX0.38MM

## (undated) DEVICE — DRSG MASTISOL

## (undated) DEVICE — DRAPE TOWEL BLUE 17" X 24"

## (undated) DEVICE — SUT VICRYL 3-0 18" PS-2 UNDYED

## (undated) DEVICE — SUCTION YANKAUER VITAL VUE

## (undated) DEVICE — SUT PLAIN GUT 4-0 18" PS-2

## (undated) DEVICE — VAGINAL PACKING 2"

## (undated) DEVICE — MARKING PEN W RULER

## (undated) DEVICE — DRSG TUBE SPANDAGE 8 10YD

## (undated) DEVICE — SAW BLADE OSTEOMED VRO 12MM

## (undated) DEVICE — AESCULAP SCALPFIX 10 CLIPS

## (undated) DEVICE — SUT PLAIN GUT 3-0 27" PS-2

## (undated) DEVICE — ELCTR COLORADO 3CM

## (undated) DEVICE — BLADE SURGICAL #15 CARBON

## (undated) DEVICE — LAP PAD 4 X 18"

## (undated) DEVICE — DRSG XEROFORM 1 X 8"

## (undated) DEVICE — MIDAS REX LEGEND TAPERED SM BORE 2.3MM X 8CM

## (undated) DEVICE — DRSG CURITY GAUZE SPONGE 4 X 4" 12-PLY

## (undated) DEVICE — BUR STRYKER EGG 4MM

## (undated) DEVICE — BLADE SURGICAL #11 CARBON

## (undated) DEVICE — SYR LUER LOK 50CC

## (undated) DEVICE — CATH ANGIOCATH 12G

## (undated) DEVICE — DRSG KERLIX ROLL 4.5"

## (undated) DEVICE — NDL SPINAL 22G X 3.5" (BLACK)

## (undated) DEVICE — PACK UPPER BODY

## (undated) DEVICE — TWIST DRILL 1.5MM DIA X 50MM W/NOTCH SGL USE ONLY

## (undated) DEVICE — DRILL BIT KLS MARTIN TWIST W NOTCH

## (undated) DEVICE — BLOCK SILICON

## (undated) DEVICE — COMPRESSION CHIN-NECK SMALL